# Patient Record
Sex: MALE | NOT HISPANIC OR LATINO | Employment: FULL TIME | URBAN - METROPOLITAN AREA
[De-identification: names, ages, dates, MRNs, and addresses within clinical notes are randomized per-mention and may not be internally consistent; named-entity substitution may affect disease eponyms.]

---

## 2017-07-25 ENCOUNTER — LAB REQUISITION (OUTPATIENT)
Dept: LAB | Facility: HOSPITAL | Age: 62
End: 2017-07-25

## 2017-07-25 ENCOUNTER — OUTSIDE FACILITY SERVICE (OUTPATIENT)
Dept: GASTROENTEROLOGY | Facility: CLINIC | Age: 62
End: 2017-07-25

## 2017-07-25 DIAGNOSIS — D12.2 BENIGN NEOPLASM OF ASCENDING COLON: ICD-10-CM

## 2017-07-25 PROCEDURE — 88305 TISSUE EXAM BY PATHOLOGIST: CPT | Performed by: INTERNAL MEDICINE

## 2017-07-25 PROCEDURE — 45385 COLONOSCOPY W/LESION REMOVAL: CPT | Performed by: INTERNAL MEDICINE

## 2017-07-26 LAB
CYTO UR: NORMAL
LAB AP CASE REPORT: NORMAL
LAB AP CLINICAL INFORMATION: NORMAL
Lab: NORMAL
PATH REPORT.FINAL DX SPEC: NORMAL
PATH REPORT.GROSS SPEC: NORMAL

## 2019-09-16 DIAGNOSIS — Z12.11 SCREENING FOR COLON CANCER: Primary | ICD-10-CM

## 2019-09-19 DIAGNOSIS — Z12.11 SCREENING FOR COLON CANCER: Primary | ICD-10-CM

## 2019-09-26 ENCOUNTER — OUTSIDE FACILITY SERVICE (OUTPATIENT)
Dept: GASTROENTEROLOGY | Facility: CLINIC | Age: 64
End: 2019-09-26

## 2019-09-26 ENCOUNTER — LAB REQUISITION (OUTPATIENT)
Dept: LAB | Facility: HOSPITAL | Age: 64
End: 2019-09-26

## 2019-09-26 DIAGNOSIS — Z86.010 HISTORY OF COLONIC POLYPS: ICD-10-CM

## 2019-09-26 DIAGNOSIS — Z12.11 ENCOUNTER FOR SCREENING FOR MALIGNANT NEOPLASM OF COLON: ICD-10-CM

## 2019-09-26 PROCEDURE — 45385 COLONOSCOPY W/LESION REMOVAL: CPT | Performed by: INTERNAL MEDICINE

## 2019-09-26 PROCEDURE — 88305 TISSUE EXAM BY PATHOLOGIST: CPT | Performed by: INTERNAL MEDICINE

## 2019-09-27 LAB
CYTO UR: NORMAL
LAB AP CASE REPORT: NORMAL
LAB AP CLINICAL INFORMATION: NORMAL
PATH REPORT.FINAL DX SPEC: NORMAL
PATH REPORT.GROSS SPEC: NORMAL

## 2024-05-01 ENCOUNTER — OFFICE VISIT (OUTPATIENT)
Dept: CARDIOLOGY | Facility: CLINIC | Age: 69
End: 2024-05-01
Payer: MEDICARE

## 2024-05-01 VITALS
OXYGEN SATURATION: 98 % | WEIGHT: 154 LBS | HEIGHT: 64 IN | DIASTOLIC BLOOD PRESSURE: 86 MMHG | BODY MASS INDEX: 26.29 KG/M2 | SYSTOLIC BLOOD PRESSURE: 180 MMHG | HEART RATE: 64 BPM

## 2024-05-01 DIAGNOSIS — R06.09 DOE (DYSPNEA ON EXERTION): Primary | ICD-10-CM

## 2024-05-01 DIAGNOSIS — E78.2 MIXED HYPERLIPIDEMIA: ICD-10-CM

## 2024-05-01 DIAGNOSIS — I10 PRIMARY HYPERTENSION: ICD-10-CM

## 2024-05-01 PROCEDURE — 99203 OFFICE O/P NEW LOW 30 MIN: CPT | Performed by: INTERNAL MEDICINE

## 2024-05-01 RX ORDER — TAMSULOSIN HYDROCHLORIDE 0.4 MG/1
1 CAPSULE ORAL
COMMUNITY
Start: 2024-02-14

## 2024-05-01 RX ORDER — FINASTERIDE 5 MG/1
5 TABLET, FILM COATED ORAL DAILY
COMMUNITY

## 2024-05-01 NOTE — PROGRESS NOTES
[facility]  Consultation H&P  Tiffany Pelayo  1955  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 year old male with the above mentioned PMH who presents for consult from Akin West MD for evaluation of hyperlipidemia and cardiovascular risk.    Pt denies any chest pain, dyspnea at rest, dyspnea on exertion, orthopnea, PND, palpitations, lower extremity edema, or claudication. Pt denies history of CHF, DVT, PE, MI, CVA, TIA, or rheumatic fever.   Pt with multiple family members with CAD events.   Patient states that he does yard work pushing and weed eating and does neighbors work as well.  He notes no overt symptoms.  He has not followed his blood pressure regularly at home  He notes he does have a high sodium diet     ROS  Review of Systems   Cardiovascular:  Positive for dyspnea on exertion.   All other systems reviewed and are negative.      SOCIAL HX  Social History     Socioeconomic History    Marital status:    Tobacco Use    Smoking status: Never    Smokeless tobacco: Never   Vaping Use    Vaping status: Never Used   Substance and Sexual Activity    Alcohol use: Never    Drug use: Never    Sexual activity: Yes     Partners: Female  "      FAMILY HX  Family History   Problem Relation Age of Onset    Cancer Mother     Heart attack Father     Cancer Brother        Vitals:    05/01/24 0900   BP: 180/86   BP Location: Right arm   Patient Position: Sitting   Cuff Size: Adult   Pulse: 64   SpO2: 98%   Weight: 69.9 kg (154 lb)   Height: 162.6 cm (64\")     Body mass index is 26.43 kg/m².     PHYSICAL EXAMINATION:  Constitutional:       Appearance: Healthy appearance. Not in distress.   Neck:      Vascular: No JVR. JVD normal.   Pulmonary:      Effort: Pulmonary effort is normal.      Breath sounds: Normal breath sounds. No wheezing. No rhonchi. No rales.   Chest:      Chest wall: Not tender to palpatation.   Cardiovascular:      PMI at left midclavicular line. Normal rate. Regular rhythm. Normal S1. Normal S2.       Murmurs: There is no murmur.      No gallop.  No click. No rub.   Pulses:     Intact distal pulses.   Edema:     Peripheral edema absent.   Abdominal:      General: Bowel sounds are normal.      Palpations: Abdomen is soft.      Tenderness: There is no abdominal tenderness.   Musculoskeletal: Normal range of motion.         General: No tenderness. Skin:     General: Skin is warm and dry.   Neurological:      General: No focal deficit present.      Mental Status: Alert and oriented to person, place and time.         Diagnostic Data:  Procedures    2/24/24  TSH 6.97  T4  1.0  Lipid 064-92-  CBC Normal  BMP , BUN 15, CR 1.15, , K 4.5, CA 10.9  LFTs Normal    Advance Care Planning   ACP discussion was held with the patient during this visit. Patient does not have an advance directive, information provided.         ASSESSMENT:     Diagnosis Plan   1. SAMPSON (dyspnea on exertion)        2. Mixed hyperlipidemia        3. Primary hypertension            PLAN:    Dyspnea on exertion multiplifactorial with document cardiac calcium score at this time    Mixed dyslipidemia document cardiac calcium score at this time.  Potential cardiac " CTA will be considered    One-time elevated blood pressure patient is to obtain home blood pressure monitor and will also review PCP notes with historical data      Akin West MD, thank you for referring Mr. Pelayo for evaluation.  I have forwarded my electronically generated recommendations to you for review.  Please do not hesitate to call with any questions.      Jose Calvin MD, FACC

## 2024-05-16 ENCOUNTER — TELEPHONE (OUTPATIENT)
Dept: CARDIOLOGY | Facility: CLINIC | Age: 69
End: 2024-05-16
Payer: MEDICARE

## 2024-05-16 NOTE — TELEPHONE ENCOUNTER
Jose Calvin MD Jackson, Kristina, RN  Cardiac calcium score 0    Notified pts spouse of the above results. Spouse verbalized understanding , no further questions or concerns .

## 2024-07-30 ENCOUNTER — OFFICE VISIT (OUTPATIENT)
Dept: UROLOGY | Facility: CLINIC | Age: 69
End: 2024-07-30
Payer: MEDICARE

## 2024-07-30 ENCOUNTER — LAB (OUTPATIENT)
Dept: LAB | Facility: HOSPITAL | Age: 69
End: 2024-07-30
Payer: MEDICARE

## 2024-07-30 VITALS
SYSTOLIC BLOOD PRESSURE: 148 MMHG | HEIGHT: 63 IN | TEMPERATURE: 97.9 F | HEART RATE: 71 BPM | BODY MASS INDEX: 28.07 KG/M2 | WEIGHT: 158.4 LBS | OXYGEN SATURATION: 97 % | RESPIRATION RATE: 12 BRPM | DIASTOLIC BLOOD PRESSURE: 90 MMHG

## 2024-07-30 DIAGNOSIS — N48.6 PEYRONIE'S DISEASE: ICD-10-CM

## 2024-07-30 DIAGNOSIS — N52.9 ERECTILE DYSFUNCTION, UNSPECIFIED ERECTILE DYSFUNCTION TYPE: ICD-10-CM

## 2024-07-30 DIAGNOSIS — N13.8 BENIGN PROSTATIC HYPERPLASIA WITH URINARY OBSTRUCTION: Primary | ICD-10-CM

## 2024-07-30 DIAGNOSIS — N13.8 BENIGN PROSTATIC HYPERPLASIA WITH URINARY OBSTRUCTION: ICD-10-CM

## 2024-07-30 DIAGNOSIS — N40.1 BENIGN PROSTATIC HYPERPLASIA WITH URINARY OBSTRUCTION: ICD-10-CM

## 2024-07-30 DIAGNOSIS — N40.1 BENIGN PROSTATIC HYPERPLASIA WITH URINARY OBSTRUCTION: Primary | ICD-10-CM

## 2024-07-30 LAB
BILIRUB BLD-MCNC: NEGATIVE MG/DL
CLARITY, POC: CLEAR
COLOR UR: NORMAL
EXPIRATION DATE: NORMAL
GLUCOSE UR STRIP-MCNC: NEGATIVE MG/DL
KETONES UR QL: NEGATIVE
LEUKOCYTE EST, POC: NEGATIVE
Lab: NORMAL
NITRITE UR-MCNC: NEGATIVE MG/ML
PH UR: 6 [PH] (ref 5–8)
PROT UR STRIP-MCNC: NEGATIVE MG/DL
PSA SERPL-MCNC: 4.47 NG/ML (ref 0–4)
RBC # UR STRIP: NEGATIVE /UL
SP GR UR: 1.01 (ref 1–1.03)
UROBILINOGEN UR QL: NORMAL

## 2024-07-30 PROCEDURE — 84153 ASSAY OF PSA TOTAL: CPT

## 2024-07-30 PROCEDURE — 36415 COLL VENOUS BLD VENIPUNCTURE: CPT

## 2024-07-30 RX ORDER — LORATADINE 10 MG/1
10 TABLET ORAL CONTINUOUS PRN
COMMUNITY

## 2024-07-30 NOTE — PROGRESS NOTES
Office Visit Males LUTS      Patient Name: Tiffany Pelayo  : 1955   MRN: [id number]     Chief Complaint:   Chief Complaint   Patient presents with    Benign Prostatic Hypertrophy    Establish Care       Referring Provider: Akin West MD    History of Present Illness: Tiffany Pelayo is a 69 y.o. male who presents today with history of BPH on Flomax who presents with complaints of urinary hesitency and incomplete emptying.  He was recently increased to Flomax twice daily by PCP which has not helped with symptoms.  Has had history of elevated PSA in the past.  No recent PSAs checked within the past 2 years.    Primary symptom includes: incomplete emptying, frequency, intermittency, urgency, weak stream, straining, and nocturia.    Patient denies hematuria.  Onset was in .  Previous treatments include: Finasteride and twice daily Flomax     IPSS Questionnaire (AUA-7):  Incomplete emptying  Over the past month, how often have you had a sensation of not emptying your bladder completely after you finished urinating?: Almost always (24)  Frequency  Over the past month, how often have you had to urinate again less than two hours after you finishied urinating ?: Almost always (24)  Intermittency  Over the past month, how often have you found you stopped and started again several time when you urinated ?: Almost always (24)  Urgency  Over the last month, how often have you found it difficult  have you found it difficult to postpone urination ?: Almost always (24)  Weak Stream  Over the past month, how often have you had a weak urinary stream ?: Almost always (24)  Straining  Over the past month, how often have you had to push or strain to begin urination ?: Almost always (24)  Nocturia  Over the past month, how many times did you most typically get up to urinate from the time you went to bed until the time you got up in the morning ?:  "3 times (07/30/24 0958)  Quality of life due to urinary symptoms  If you were to spend the rest of your life with your urinary condition the way it is now, how would feel about that?: Terrible (07/30/24 0958)    Scores  Total IPSS Score: (!) 33 (07/30/24 0958)  Total Score = Symptomatic Level: Severely symptomatic: 20-35 (07/30/24 0958)       Subjective      Review of System: ROS reviewed by me and is negative unless otherwise noted in HPI.      Past Medical History:   Past Medical History:   Diagnosis Date    Bell palsy 2015    Colon polyp     Kidney stone 2014     Past Surgical History: History reviewed. No pertinent surgical history.    Family History:   Family History   Problem Relation Age of Onset    Heart attack Father     Benign prostatic hyperplasia Father     Uterine cancer Mother 68    Lung cancer Brother     Prostate cancer Neg Hx     Kidney cancer Neg Hx      Social History:   Social History     Socioeconomic History    Marital status:    Tobacco Use    Smoking status: Never     Passive exposure: Past    Smokeless tobacco: Never   Vaping Use    Vaping status: Never Used   Substance and Sexual Activity    Alcohol use: Never    Drug use: Never    Sexual activity: Yes     Partners: Female       Medications:     Current Outpatient Medications:     finasteride (PROSCAR) 5 MG tablet, Take 1 tablet by mouth Daily., Disp: , Rfl:     loratadine (CLARITIN) 10 MG tablet, Take 1 tablet by mouth Continuous As Needed for Allergies., Disp: , Rfl:     tamsulosin (FLOMAX) 0.4 MG capsule 24 hr capsule, Take 1 capsule by mouth every night at bedtime., Disp: , Rfl:     Allergies:   No Known Allergies    Objective     Physical Exam:   Vital Signs:   Vitals:    07/30/24 0957   BP: 148/90   BP Location: Left arm   Patient Position: Sitting   Cuff Size: Adult   Pulse: 71   Resp: 12   Temp: 97.9 °F (36.6 °C)   TempSrc: Temporal   SpO2: 97%   Weight: 71.8 kg (158 lb 6.4 oz)   Height: 161.1 cm (63.43\")     Body mass index " "is 27.68 kg/m².   Physical Exam  Vitals and nursing note reviewed.   Constitutional:       General: He is not in acute distress.     Appearance: Normal appearance. He is not ill-appearing.   HENT:      Head: Normocephalic and atraumatic.   Pulmonary:      Effort: Pulmonary effort is normal.   Genitourinary:     Comments: Deferred   Skin:     General: Skin is warm and dry.   Neurological:      General: No focal deficit present.      Mental Status: He is alert and oriented to person, place, and time.   Psychiatric:         Mood and Affect: Mood normal.         Behavior: Behavior normal.     Labs  No results found for: \"PSA\"    Brief Urine Lab Results  (Last result in the past 365 days)        Color   Clarity   Blood   Leuk Est   Nitrite   Protein   CREAT   Urine HCG        07/30/24 1008 Straw   Clear   Negative   Negative   Negative   Negative                 PVR  Post-void residual performed by staff - 25 ml.     Assessment / Plan      Assessment  Mr. Pelayo is a 69 y.o. male who presents with LUTS, primarily urinary hesitancy and feelings of incomplete emptying.  His IPSS is 33 and he rates his symptoms as terrible.  He is interested in proceeding with surgery if that is an option.  We discussed BPH work up which includes cystoscopy, TRUS, uroflow, general exam and LIZET with Dr. Shelton.  He would like to proceed.      PVR 25 ml.  UA is benign.      We discussed that due to his history of elevated PSAs in the past I would like to  have him check one today.  If elevated I will call him to discuss further at that time.      He reports that he is a history of ED and when he took Viagra in the past he had penile curvature and has not attempted since.  Would like to discuss at a future visit after BPH symptoms are managed.      Will have him follow up with Dr. Shelton for BPH workup.  He was agreeable to this plan.      Diagnoses and all orders for this visit:    1. Benign prostatic hyperplasia with urinary obstruction " (Primary)  -     POC Urinalysis Dipstick, Automated  -     PSA Diagnostic; Future    2. Peyronie's disease    3. Erectile dysfunction, unspecified erectile dysfunction type    Follow Up:   Return for with Dr. Shelton for cystoscopy, TRUS, and uroflow .    Nalini Tamayo, APRN, MSN, FNP-C  [facility]

## 2024-08-07 ENCOUNTER — OFFICE VISIT (OUTPATIENT)
Dept: UROLOGY | Facility: CLINIC | Age: 69
End: 2024-08-07
Payer: MEDICARE

## 2024-08-07 VITALS
DIASTOLIC BLOOD PRESSURE: 82 MMHG | HEIGHT: 63 IN | BODY MASS INDEX: 28 KG/M2 | SYSTOLIC BLOOD PRESSURE: 132 MMHG | WEIGHT: 158 LBS

## 2024-08-07 DIAGNOSIS — R97.20 BPH WITH ELEVATED PSA: Primary | ICD-10-CM

## 2024-08-07 DIAGNOSIS — N40.0 BPH WITH ELEVATED PSA: Primary | ICD-10-CM

## 2024-08-07 NOTE — PROGRESS NOTES
Office Visit Elevated PSA     Patient Name: Tiffany Pelayo  : 1955   MRN: [id number]     Chief Complaint: Elevated PSA.    Chief Complaint   Patient presents with    Follow-up     Psa results        Referring Provider: No ref. provider found    History of Present Illness: Mr. Tiffany Pelayo is a 69 y.o.  male who presents with history of BPH and elevated PSA to   PSA          2024    11:18   PSA   PSA 4.470    .      History of elevated PSA in the past but was told not to worry about it.  Patient complains of daytime frequency, intermittency, sensation of incomplete bladder emptying, urgency, and weak urinary stream.  His IPSS score is 33.     Patient denies fevers, chills, nausea, vomiting, constipation, flank pain, shortness of breath, leg swelling, calf pain or bone pain.    Past  history is frequent UTIs, gross hematuria, stones or other renal diseases.     No family history of prostate cancer.     Subjective      Review of System: ROS reviewed by me and is negative unless otherwise noted in HPI.      Past Medical History:   Past Medical History:   Diagnosis Date    Bell palsy     Colon polyp     Kidney stone        Past Surgical History: History reviewed. No pertinent surgical history.    Family History:   Family History   Problem Relation Age of Onset    Heart attack Father     Benign prostatic hyperplasia Father     Uterine cancer Mother 68    Lung cancer Brother     Prostate cancer Neg Hx     Kidney cancer Neg Hx      Social History:   Social History     Socioeconomic History    Marital status:    Tobacco Use    Smoking status: Never     Passive exposure: Past    Smokeless tobacco: Never   Vaping Use    Vaping status: Never Used   Substance and Sexual Activity    Alcohol use: Never    Drug use: Never    Sexual activity: Yes     Partners: Female       Medications:     Current Outpatient Medications:     finasteride (PROSCAR) 5 MG tablet, Take 1 tablet by mouth  "Daily., Disp: , Rfl:     loratadine (CLARITIN) 10 MG tablet, Take 1 tablet by mouth Continuous As Needed for Allergies., Disp: , Rfl:     tamsulosin (FLOMAX) 0.4 MG capsule 24 hr capsule, Take 1 capsule by mouth every night at bedtime., Disp: , Rfl:     Allergies:   No Known Allergies    Objective     Physical Exam:   Vital Signs:   Vitals:    08/07/24 0817   BP: 132/82   BP Location: Left arm   Patient Position: Sitting   Cuff Size: Adult   Weight: 71.7 kg (158 lb)   Height: 161.1 cm (63.43\")     Body mass index is 27.61 kg/m².   Physical Exam  Vitals and nursing note reviewed.   Constitutional:       General: He is not in acute distress.     Appearance: Normal appearance. He is not ill-appearing.   HENT:      Head: Normocephalic and atraumatic.   Pulmonary:      Effort: Pulmonary effort is normal.   Skin:     General: Skin is warm and dry.   Neurological:      General: No focal deficit present.      Mental Status: He is alert and oriented to person, place, and time.   Psychiatric:         Mood and Affect: Mood normal.         Behavior: Behavior normal.        Labs  Brief Urine Lab Results  (Last result in the past 365 days)        Color   Clarity   Blood   Leuk Est   Nitrite   Protein   CREAT   Urine HCG        07/30/24 1008 Straw   Clear   Negative   Negative   Negative   Negative                   Lab Results   Component Value Date    PSA 4.470 (H) 07/30/2024     Images:   CT HEART WITH 3D IMAGE  Result Date: 5/15/2024  Coronary artery calcium score:0 Percentile rank for age:0 No identification of atherosclerotic plaque. 5 mm left lower lobe pulmonary nodule. Six month follow up chest CT recommended per Fleischner     Assessment / Plan      Assessment  Mr. Pelayo is a 69 y.o.  male who presents with elevated PSA.  This is a new diagnosis of uncertain clinical prognosis.    I explained to the patient that an elevated PSA is a marker of risk of prostate cancer and a ExoDX would be the next step in " evaluation.  The ExoDX test will help determine the probability of finding an aggressive prostate cancer if we were to biopsy.      He is scheduled to follow up with Dr. Shelton for BPH work up in September.  Will keep this appointment as scheduled and have him return to see me and review ExoDx before this appointment.  He was agreeable to this plan.  Questions and concerns addressed to the patient's and wife's satisfaction.      Diagnoses and all orders for this visit:    1. BPH with elevated PSA (Primary)    Follow Up:   Return in about 2 weeks (around 8/21/2024) for Next scheduled follow up, with Nalini to review ExoDX.    JUANITA Hale, MSN, FNP-C  [facility]

## 2024-08-23 ENCOUNTER — OFFICE VISIT (OUTPATIENT)
Dept: UROLOGY | Facility: CLINIC | Age: 69
End: 2024-08-23
Payer: MEDICARE

## 2024-08-23 VITALS
OXYGEN SATURATION: 99 % | HEART RATE: 61 BPM | RESPIRATION RATE: 12 BRPM | HEIGHT: 63 IN | TEMPERATURE: 97.1 F | BODY MASS INDEX: 28 KG/M2 | WEIGHT: 158 LBS

## 2024-08-23 DIAGNOSIS — N40.1 BENIGN PROSTATIC HYPERPLASIA WITH URINARY OBSTRUCTION: Primary | ICD-10-CM

## 2024-08-23 DIAGNOSIS — N13.8 BENIGN PROSTATIC HYPERPLASIA WITH URINARY OBSTRUCTION: Primary | ICD-10-CM

## 2024-08-23 DIAGNOSIS — R97.20 ELEVATED PROSTATE SPECIFIC ANTIGEN (PSA): ICD-10-CM

## 2024-08-23 NOTE — PROGRESS NOTES
Office Visit Established Male Patient     Patient Name: Tiffany Pelayo  : 1955   MRN: [id number]     Chief Complaint:   Chief Complaint   Patient presents with    Benign Prostatic Hypertrophy    Follow-up       History of Present Illness: Mr. Tiffany Pelayo is a 69 y.o. male who presents today for follow up with history of BPH and elevated PSA. He is scheduled to undergo BPH work up with Dr. Shelton in September but he presents today to review his ExoDX test results.  He is feeling well today and has no new complaints.      Subjective      Review of System: ROS reviewed by me and is negative unless otherwise noted in HPI.      Past Medical History:   Past Medical History:   Diagnosis Date    Bell palsy     Benign prostatic hyperplasia     Colon polyp     Elevated PSA     Kidney stone 2014    Urinary incontinence      Past Surgical History: History reviewed. No pertinent surgical history.    Family History:   Family History   Problem Relation Age of Onset    Heart attack Father     Benign prostatic hyperplasia Father     Uterine cancer Mother 68    Lung cancer Brother     Prostate cancer Neg Hx     Kidney cancer Neg Hx      Social History:   Social History     Socioeconomic History    Marital status:    Tobacco Use    Smoking status: Never     Passive exposure: Past    Smokeless tobacco: Never   Vaping Use    Vaping status: Never Used   Substance and Sexual Activity    Alcohol use: Never    Drug use: Never    Sexual activity: Not Currently     Partners: Female     Medications:     Current Outpatient Medications:     finasteride (PROSCAR) 5 MG tablet, Take 1 tablet by mouth Daily., Disp: , Rfl:     loratadine (CLARITIN) 10 MG tablet, Take 1 tablet by mouth Continuous As Needed for Allergies., Disp: , Rfl:     tamsulosin (FLOMAX) 0.4 MG capsule 24 hr capsule, Take 1 capsule by mouth every night at bedtime., Disp: , Rfl:     Allergies:   No Known Allergies    Objective     Physical Exam:   Vital  "Signs:   Vitals:    08/23/24 1008   Pulse: 61   Resp: 12   Temp: 97.1 °F (36.2 °C)   TempSrc: Temporal   SpO2: 99%   Weight: 71.7 kg (158 lb)   Height: 161.1 cm (63.43\")     Body mass index is 27.61 kg/m².   Physical Exam  Vitals and nursing note reviewed.   Constitutional:       General: He is not in acute distress.     Appearance: Normal appearance. He is not ill-appearing.   HENT:      Head: Normocephalic and atraumatic.   Pulmonary:      Effort: Pulmonary effort is normal.   Skin:     General: Skin is warm and dry.   Neurological:      General: No focal deficit present.      Mental Status: He is alert and oriented to person, place, and time.   Psychiatric:         Mood and Affect: Mood normal.         Behavior: Behavior normal.      Labs  Brief Urine Lab Results  (Last result in the past 365 days)        Color   Clarity   Blood   Leuk Est   Nitrite   Protein   CREAT   Urine HCG        07/30/24 1008 Straw   Clear   Negative   Negative   Negative   Negative                 Radiographic Studies  CT HEART WITH 3D IMAGE  Result Date: 5/15/2024  Coronary artery calcium score:0 Percentile rank for age:0 No identification of atherosclerotic plaque. 5 mm left lower lobe pulmonary nodule. Six month follow up chest CT recommended per Fleischner criteria.    I have reviewed the above labs and imaging.     Assessment / Plan      Assessment/Plan: Mr. Tiffany Pelayo is a 69 y.o. male who presents today for follow up with history of BPH and elevated PSA. He is scheduled to undergo BPH work up with Dr. Shelton in September but he presents today to review his ExoDX test results.  His ExoDx prostate test score is 20.64 which indicates that he has a higher risk of finding a high grade prostate cancer if biopsy were to be performed.      Shared decision making discussion was had today.  He would like to proceed with a prostate MRI for further evaluation and keep scheduled follow up with Dr. Shelton for the BPH work up.  Will have Mr. Pelayo " keep next appointment as scheduled and follow up with me to review prostate MRI results.  He was agreeable to this plan.  Questions/concerns addressed.     Diagnoses and all orders for this visit:    1. Benign prostatic hyperplasia with urinary obstruction (Primary)    2. Elevated prostate specific antigen (PSA)  -     MRI prostate w wo contrast; Future    Follow Up:   Return for Keep Follow up with Dr. Shelton for BPH work up.  Then with Nalini to review MRI.  .    JUANITA Hale, MSN, FNP-C  [facility]

## 2024-09-03 ENCOUNTER — HOSPITAL ENCOUNTER (OUTPATIENT)
Dept: MRI IMAGING | Facility: HOSPITAL | Age: 69
Discharge: HOME | End: 2024-09-03
Admitting: NURSE PRACTITIONER
Payer: MEDICARE

## 2024-09-03 DIAGNOSIS — R97.20 ELEVATED PROSTATE SPECIFIC ANTIGEN (PSA): ICD-10-CM

## 2024-09-03 PROCEDURE — 72197 MRI PELVIS W/O & W/DYE: CPT

## 2024-09-03 PROCEDURE — 0 GADOBENATE DIMEGLUMINE 529 MG/ML SOLUTION: Performed by: NURSE PRACTITIONER

## 2024-09-03 PROCEDURE — A9577 INJ MULTIHANCE: HCPCS | Performed by: NURSE PRACTITIONER

## 2024-09-03 RX ADMIN — GADOBENATE DIMEGLUMINE 14 ML: 529 INJECTION, SOLUTION INTRAVENOUS at 08:01

## 2024-09-12 ENCOUNTER — PROCEDURE VISIT (OUTPATIENT)
Dept: UROLOGY | Facility: CLINIC | Age: 69
End: 2024-09-12
Payer: MEDICARE

## 2024-09-12 ENCOUNTER — PRE-ADMISSION TESTING (OUTPATIENT)
Dept: PREADMISSION TESTING | Facility: HOSPITAL | Age: 69
End: 2024-09-12
Payer: MEDICARE

## 2024-09-12 VITALS — WEIGHT: 156.6 LBS | BODY MASS INDEX: 26.88 KG/M2

## 2024-09-12 DIAGNOSIS — N40.0 BPH WITH ELEVATED PSA: ICD-10-CM

## 2024-09-12 DIAGNOSIS — N13.8 BPH WITH OBSTRUCTION/LOWER URINARY TRACT SYMPTOMS: ICD-10-CM

## 2024-09-12 DIAGNOSIS — N40.0 BPH WITH ELEVATED PSA: Primary | ICD-10-CM

## 2024-09-12 DIAGNOSIS — N21.0 BLADDER STONE: ICD-10-CM

## 2024-09-12 DIAGNOSIS — R97.20 BPH WITH ELEVATED PSA: ICD-10-CM

## 2024-09-12 DIAGNOSIS — N40.1 BPH WITH OBSTRUCTION/LOWER URINARY TRACT SYMPTOMS: ICD-10-CM

## 2024-09-12 DIAGNOSIS — N39.41 URGE INCONTINENCE OF URINE: ICD-10-CM

## 2024-09-12 DIAGNOSIS — R97.20 BPH WITH ELEVATED PSA: Primary | ICD-10-CM

## 2024-09-12 LAB
ANION GAP SERPL CALCULATED.3IONS-SCNC: 7.5 MMOL/L (ref 5–15)
BACTERIA UR QL AUTO: ABNORMAL /HPF
BILIRUB UR QL STRIP: NEGATIVE
BUN SERPL-MCNC: 13 MG/DL (ref 8–23)
BUN/CREAT SERPL: 10.7 (ref 7–25)
CALCIUM SPEC-SCNC: 10.6 MG/DL (ref 8.6–10.5)
CHLORIDE SERPL-SCNC: 104 MMOL/L (ref 98–107)
CLARITY UR: CLEAR
CO2 SERPL-SCNC: 24.5 MMOL/L (ref 22–29)
COLOR UR: YELLOW
CREAT SERPL-MCNC: 1.22 MG/DL (ref 0.76–1.27)
DEPRECATED RDW RBC AUTO: 40.7 FL (ref 37–54)
EGFRCR SERPLBLD CKD-EPI 2021: 64.2 ML/MIN/1.73
ERYTHROCYTE [DISTWIDTH] IN BLOOD BY AUTOMATED COUNT: 13.1 % (ref 12.3–15.4)
GLUCOSE SERPL-MCNC: 119 MG/DL (ref 65–99)
GLUCOSE UR STRIP-MCNC: NEGATIVE MG/DL
HCT VFR BLD AUTO: 47.1 % (ref 37.5–51)
HGB BLD-MCNC: 16.1 G/DL (ref 13–17.7)
HGB UR QL STRIP.AUTO: ABNORMAL
HYALINE CASTS UR QL AUTO: ABNORMAL /LPF
KETONES UR QL STRIP: NEGATIVE
LEUKOCYTE ESTERASE UR QL STRIP.AUTO: ABNORMAL
MCH RBC QN AUTO: 29.1 PG (ref 26.6–33)
MCHC RBC AUTO-ENTMCNC: 34.2 G/DL (ref 31.5–35.7)
MCV RBC AUTO: 85 FL (ref 79–97)
NITRITE UR QL STRIP: NEGATIVE
PH UR STRIP.AUTO: 5.5 [PH] (ref 5–8)
PLATELET # BLD AUTO: 280 10*3/MM3 (ref 140–450)
PMV BLD AUTO: 10.6 FL (ref 6–12)
POTASSIUM SERPL-SCNC: 4.7 MMOL/L (ref 3.5–5.2)
PROT UR QL STRIP: ABNORMAL
RBC # BLD AUTO: 5.54 10*6/MM3 (ref 4.14–5.8)
RBC # UR STRIP: ABNORMAL /HPF
REF LAB TEST METHOD: ABNORMAL
SODIUM SERPL-SCNC: 136 MMOL/L (ref 136–145)
SP GR UR STRIP: 1.01 (ref 1–1.03)
SQUAMOUS #/AREA URNS HPF: ABNORMAL /HPF
UROBILINOGEN UR QL STRIP: ABNORMAL
WBC # UR STRIP: ABNORMAL /HPF
WBC NRBC COR # BLD AUTO: 7.14 10*3/MM3 (ref 3.4–10.8)

## 2024-09-12 PROCEDURE — 81001 URINALYSIS AUTO W/SCOPE: CPT

## 2024-09-12 PROCEDURE — 36415 COLL VENOUS BLD VENIPUNCTURE: CPT

## 2024-09-12 PROCEDURE — 85027 COMPLETE CBC AUTOMATED: CPT

## 2024-09-12 PROCEDURE — 87086 URINE CULTURE/COLONY COUNT: CPT

## 2024-09-12 PROCEDURE — 80048 BASIC METABOLIC PNL TOTAL CA: CPT

## 2024-09-12 RX ORDER — SODIUM CHLORIDE 0.9 % (FLUSH) 0.9 %
3-10 SYRINGE (ML) INJECTION AS NEEDED
OUTPATIENT
Start: 2024-09-12

## 2024-09-12 RX ORDER — SODIUM CHLORIDE 9 MG/ML
100 INJECTION, SOLUTION INTRAVENOUS CONTINUOUS
OUTPATIENT
Start: 2024-09-12

## 2024-09-12 RX ORDER — SODIUM CHLORIDE 9 MG/ML
40 INJECTION, SOLUTION INTRAVENOUS AS NEEDED
OUTPATIENT
Start: 2024-09-12

## 2024-09-12 RX ORDER — SODIUM CHLORIDE 0.9 % (FLUSH) 0.9 %
3 SYRINGE (ML) INJECTION EVERY 12 HOURS SCHEDULED
OUTPATIENT
Start: 2024-09-12

## 2024-09-12 NOTE — PROGRESS NOTES
"CC  LUTS / BPH Workup    HPI  Ms. Pelayo is a 69 y.o. male with history below in assessment, who presents for follow up.     At this visit patient is here for BPH workup and discussion.     Past Medical History:   Diagnosis Date    Bell palsy 2015    Benign prostatic hyperplasia     Colon polyp     Elevated PSA     Kidney stone 2014    Urinary incontinence        No past surgical history on file.      Current Outpatient Medications:     finasteride (PROSCAR) 5 MG tablet, Take 1 tablet by mouth Daily., Disp: , Rfl:     loratadine (CLARITIN) 10 MG tablet, Take 1 tablet by mouth Continuous As Needed for Allergies., Disp: , Rfl:     tamsulosin (FLOMAX) 0.4 MG capsule 24 hr capsule, Take 1 capsule by mouth every night at bedtime., Disp: , Rfl:      Physical Exam  There were no vitals taken for this visit.    Labs  Brief Urine Lab Results  (Last result in the past 365 days)        Color   Clarity   Blood   Leuk Est   Nitrite   Protein   CREAT   Urine HCG        07/30/24 1008 Straw   Clear   Negative   Negative   Negative   Negative                   No results found for: \"GLUCOSE\", \"CALCIUM\", \"NA\", \"K\", \"CO2\", \"CL\", \"BUN\", \"CREATININE\", \"EGFRIFAFRI\", \"EGFRIFNONA\", \"BCR\", \"ANIONGAP\"    No results found for: \"WBC\", \"HGB\", \"HCT\", \"MCV\", \"PLT\"         Lab Results   Component Value Date    PSA 4.470 (H) 07/30/2024       Radiographic Studies  MRI Prostate With & Without Contrast  Result Date: 9/5/2024  Impression: No MR findings of clinically significant prostate cancer (PI-RADS 2 exam). BPH with gland volume of 145 cc. Urinary bladder stone. Electronically Signed: Stephen Sunshine MD  9/5/2024 1:37 PM EDT  Workstation ID: [id number]    CT HEART WITH 3D IMAGE  Result Date: 5/15/2024  Coronary artery calcium score:0 Percentile rank for age:0 No identification of atherosclerotic plaque. 5 mm left lower lobe pulmonary nodule. Six month follow up chest CT recommended per Fleischner criteria. Images reviewed, interpreted, and dictated by " Dr. GWENDOLYN Bustillo. Transcribed by Karen Miner PA-C.    I have reviewed above labs and imaging.     CYSTOSCOPY  Preprocedure diagnosis  LUTS  Postprocedure diagnosis  Same  Procedure  Flexible Cystourethroscopy  Attending surgeon  Johnie Shelton MD  Anesthesia  2% lidocaine jelly intraurethrally  Complications  None  Indications  69 y.o. male undergoing a flexible cystoscopy for the above mentioned indications.  Informed consent was obtained.    Findings  Cystoscopic findings included one right and left ureteral orifice in the normal anatomic position with chronically damaged bladder mucosa and no tumors. The urethral urothelium was within normal limits with no strictures.  There was a prominent median lobe.  The lateral lobes were very large and obstructive in appearance.  Large bladder stone  Procedure  The patient was placed in supine position and prepped and draped in sterile fashion with lidocaine jelly per urethra for anesthesia.  A timeout was performed.  The 14F flexible cystoscope was lubricated and gently placed through the penile urethra and into the bladder.  The bladder was completely visualized.  The cystoscope was retroflexed and the bladder neck and prostate visualized.  The cystoscope was slowly withdrawn while visualizing the urethra and the procedure terminated.  The patient tolerated the procedure well.        UROFLOW  His uroflow would not for now but his stream was very weak.    I personally reviewed  and interpreted this study.       Assessment  69 y.o. male with worsening of chronic lower urinary tract symptoms.  145 cc prostate with large median lobe, difficulty emptying, urge incontinence weak stream, large 1.5 cm bladder stone    In a separate room and encounter after his workup, we discussed that he is maxed out on medical therapy.  He is otherwise quite healthy.    Plan  1. Schedule for HoLEP and cystolitholapaxy.  Risks are very large prostate and bladder  stone.

## 2024-09-12 NOTE — DISCHARGE INSTRUCTIONS
Pre-Admission testing appointment completed today for patient's upcoming procedure on 9/20/24 at [facility].     PAT PASS reviewed with patient and they verbalize understanding of the following:     Do not eat or drink anything after midnight the night before procedure unless otherwise instructed by physician/surgeon's office, this includes no gum, candy, mints, tobacco products or e-cigarettes.  Do not shave the area to be operated on at least 48 hours prior to procedure.  Do not wear makeup, lotion, hair products, or nail polish.  Do not wear any jewelry and remove all piercings.  Do not wear any adhesive if you wear dentures.  Do not wear contacts; bring in glasses if needed.  Only take medications on the morning of procedure as instructed by PAT nurse per anesthesia guidelines or as instructed by physician's office.  If you are on any blood thinners reach out to the physician/surgeon's office for instructions on when/if they will need to be stopped prior to procedure.  Bring in picture ID and insurance card, advanced directive copies if applicable, CPAP/BIPAP/Inhalers if indicated morning of procedure, leave any other valuables at home.  Ensure you have arranged for someone to drive you home the day of your procedure and someone to care for you at home afterwards. It is recommended that you do not drive, drink alcohol, or make any major legal decisions for at least 24 hours after your procedure is complete.   Chlorhexidine sponge along with instruction/information sheet given to patient. Readybath wipes given in lieu of CHG if patient has CHG allergy. Instructed patient to date, time, and initial the verification sheet once skin prep has been completed, and to return to [facility] the day of the procedure.       Introduction to anesthesia video watched by patient during appointment and anesthesia FAQ tip sheet given to patient.  Instructions and information given to patient about parking, hospital  entrance, and registration location.

## 2024-09-13 ENCOUNTER — TELEPHONE (OUTPATIENT)
Dept: UROLOGY | Facility: CLINIC | Age: 69
End: 2024-09-13

## 2024-09-13 DIAGNOSIS — N21.0 BLADDER STONE: Primary | ICD-10-CM

## 2024-09-13 LAB — BACTERIA SPEC AEROBE CULT: NO GROWTH

## 2024-09-13 RX ORDER — OXYCODONE HYDROCHLORIDE 5 MG/1
5 TABLET ORAL EVERY 6 HOURS PRN
Qty: 5 TABLET | Refills: 0 | Status: SHIPPED | OUTPATIENT
Start: 2024-09-13

## 2024-09-13 RX ORDER — OXYBUTYNIN CHLORIDE 10 MG/1
10 TABLET, EXTENDED RELEASE ORAL DAILY PRN
Qty: 10 TABLET | Refills: 0 | Status: SHIPPED | OUTPATIENT
Start: 2024-09-13

## 2024-09-13 RX ORDER — DOCUSATE SODIUM 100 MG/1
100 CAPSULE, LIQUID FILLED ORAL 2 TIMES DAILY
Qty: 15 CAPSULE | Refills: 1 | Status: SHIPPED | OUTPATIENT
Start: 2024-09-13

## 2024-09-13 RX ORDER — ACETAMINOPHEN 500 MG
1000 TABLET ORAL EVERY 6 HOURS
Qty: 30 TABLET | Refills: 0 | Status: SHIPPED | OUTPATIENT
Start: 2024-09-13 | End: 2024-09-16

## 2024-09-13 NOTE — TELEPHONE ENCOUNTER
Caller: DANIEL UMANA    Relationship: Emergency Contact    Best call back number: [phone]    Requested Prescriptions:   PAIN MEDICATION       Pharmacy where request should be sent:  [facility] [place], [place]    Last office visit with prescribing clinician: Visit date not found   Last telemedicine visit with prescribing clinician: Visit date not found   Next office visit with prescribing clinician: Visit date not found     Additional details provided by patient: PT LAST SEEN 9.12.24 AND IS EXPERIENCING PAIN SINCE DISCOVERY OF BLADDER STONE NEED PAIN RX THAT WILL LAST UNITL SURGERY 9.20.24    Does the patient have less than a 3 day supply:  [x] Yes  [] No    Would you like a call back once the refill request has been completed: [x] Yes [] No    If the office needs to give you a call back, can they leave a voicemail: [x] Yes [] No    Fior Turner Rep   09/13/24 11:13 EDT

## 2024-10-14 ENCOUNTER — OFFICE VISIT (OUTPATIENT)
Dept: UROLOGY | Facility: CLINIC | Age: 69
End: 2024-10-14
Payer: MEDICARE

## 2024-10-14 VITALS
HEIGHT: 64 IN | OXYGEN SATURATION: 98 % | SYSTOLIC BLOOD PRESSURE: 160 MMHG | BODY MASS INDEX: 26.63 KG/M2 | DIASTOLIC BLOOD PRESSURE: 88 MMHG | HEART RATE: 73 BPM | WEIGHT: 156 LBS

## 2024-10-14 DIAGNOSIS — N21.0 BLADDER STONE: Primary | ICD-10-CM

## 2024-10-14 DIAGNOSIS — R30.0 DYSURIA: ICD-10-CM

## 2024-10-14 DIAGNOSIS — N39.41 URGE INCONTINENCE OF URINE: ICD-10-CM

## 2024-10-14 DIAGNOSIS — N41.9 PROSTATITIS, UNSPECIFIED PROSTATITIS TYPE: ICD-10-CM

## 2024-10-14 DIAGNOSIS — N40.1 BPH WITH OBSTRUCTION/LOWER URINARY TRACT SYMPTOMS: ICD-10-CM

## 2024-10-14 DIAGNOSIS — N13.8 BPH WITH OBSTRUCTION/LOWER URINARY TRACT SYMPTOMS: ICD-10-CM

## 2024-10-14 PROCEDURE — 1160F RVW MEDS BY RX/DR IN RCRD: CPT | Performed by: UROLOGY

## 2024-10-14 PROCEDURE — 1159F MED LIST DOCD IN RCRD: CPT | Performed by: UROLOGY

## 2024-10-14 PROCEDURE — 99214 OFFICE O/P EST MOD 30 MIN: CPT | Performed by: UROLOGY

## 2024-10-14 PROCEDURE — 96372 THER/PROPH/DIAG INJ SC/IM: CPT | Performed by: UROLOGY

## 2024-10-14 PROCEDURE — 51798 US URINE CAPACITY MEASURE: CPT | Performed by: UROLOGY

## 2024-10-14 RX ORDER — TAMSULOSIN HYDROCHLORIDE 0.4 MG/1
1 CAPSULE ORAL
Qty: 90 CAPSULE | Refills: 4 | Status: SHIPPED | OUTPATIENT
Start: 2024-10-14

## 2024-10-14 RX ORDER — CEFTRIAXONE 1 G/1
1 INJECTION, POWDER, FOR SOLUTION INTRAMUSCULAR; INTRAVENOUS ONCE
Status: COMPLETED | OUTPATIENT
Start: 2024-10-14 | End: 2024-10-14

## 2024-10-14 RX ORDER — LEVOFLOXACIN 500 MG/1
500 TABLET, FILM COATED ORAL DAILY
Qty: 14 TABLET | Refills: 0 | Status: SHIPPED | OUTPATIENT
Start: 2024-10-14 | End: 2024-10-28

## 2024-10-14 RX ADMIN — CEFTRIAXONE 1 G: 1 INJECTION, POWDER, FOR SOLUTION INTRAMUSCULAR; INTRAVENOUS at 15:48

## 2024-10-18 DIAGNOSIS — N13.8 BPH WITH OBSTRUCTION/LOWER URINARY TRACT SYMPTOMS: Primary | ICD-10-CM

## 2024-10-18 DIAGNOSIS — N40.1 BPH WITH OBSTRUCTION/LOWER URINARY TRACT SYMPTOMS: Primary | ICD-10-CM

## 2024-10-18 LAB
BACTERIA UR CULT: ABNORMAL
BACTERIA UR CULT: ABNORMAL
OTHER ANTIBIOTIC SUSC ISLT: ABNORMAL

## 2024-10-18 RX ORDER — SULFAMETHOXAZOLE/TRIMETHOPRIM 800-160 MG
1 TABLET ORAL 2 TIMES DAILY
Qty: 14 TABLET | Refills: 0 | Status: SHIPPED | OUTPATIENT
Start: 2024-10-18 | End: 2024-10-25

## 2024-10-18 NOTE — PROGRESS NOTES
Please tell patient that his culture from the prostatitis did grow E. coli.  Finish out the Levaquin, and I sent a different antibiotic to start taking 1 week prior to surgery.

## 2024-10-22 ENCOUNTER — TELEPHONE (OUTPATIENT)
Dept: UROLOGY | Facility: CLINIC | Age: 69
End: 2024-10-22
Payer: MEDICARE

## 2024-12-20 ENCOUNTER — TELEPHONE (OUTPATIENT)
Dept: UROLOGY | Facility: CLINIC | Age: 69
End: 2024-12-20

## 2024-12-20 NOTE — TELEPHONE ENCOUNTER
Hub staff attempted to follow warm transfer process and was unsuccessful     Caller: DANIEL UMANA    Relationship to patient: Emergency Contact    Best call back number: [phone]    Patient is needing: RECEIVED  A CALL FROM PT'S SPOUSE CALLING IN REGARDS FOR PT TO BE RESCHEDULED FOR THE PROCEDURE. OFFICE HAS NOT RESCHEDULED. PROCEDURE WAS SCHEDULED ON 11/22/24. OFFICE PLEASE CALL BACK FOR PT TO BE RESCHEDULED FOR THE PROCEDURE.

## 2024-12-23 DIAGNOSIS — N13.8 BPH WITH OBSTRUCTION/LOWER URINARY TRACT SYMPTOMS: Primary | ICD-10-CM

## 2024-12-23 DIAGNOSIS — N21.0 BLADDER STONE: ICD-10-CM

## 2024-12-23 DIAGNOSIS — N40.1 BPH WITH OBSTRUCTION/LOWER URINARY TRACT SYMPTOMS: Primary | ICD-10-CM

## 2024-12-23 RX ORDER — NITROFURANTOIN 25; 75 MG/1; MG/1
100 CAPSULE ORAL 2 TIMES DAILY
Qty: 14 CAPSULE | Refills: 0 | Status: SHIPPED | OUTPATIENT
Start: 2024-12-23

## 2025-01-07 ENCOUNTER — LAB REQUISITION (OUTPATIENT)
Dept: LAB | Facility: HOSPITAL | Age: 70
End: 2025-01-07
Payer: MEDICARE

## 2025-01-07 DIAGNOSIS — N21.0 CALCULUS IN BLADDER: ICD-10-CM

## 2025-01-07 PROCEDURE — 87205 SMEAR GRAM STAIN: CPT | Performed by: UROLOGY

## 2025-01-07 PROCEDURE — 82365 CALCULUS SPECTROSCOPY: CPT | Performed by: UROLOGY

## 2025-01-07 PROCEDURE — 87070 CULTURE OTHR SPECIMN AEROBIC: CPT | Performed by: UROLOGY

## 2025-01-10 LAB
BACTERIA SPEC AEROBE CULT: NORMAL
CALCIUM OXALATE DIHYDRATE MFR STONE IR: 20 %
COLOR STONE: NORMAL
COM MFR STONE: 70 %
COMPN STONE: NORMAL
GRAM STN SPEC: NORMAL
HYDROXYAPATITE: 10 %
LABORATORY COMMENT REPORT: NORMAL
Lab: NORMAL
Lab: NORMAL
PHOTO: NORMAL
SIZE STONE: NORMAL MM
SPEC SOURCE SUBJ: NORMAL
STONE ANALYSIS-IMP: NORMAL
WT STONE: 40 MG

## 2025-02-03 ENCOUNTER — OFFICE VISIT (OUTPATIENT)
Dept: UROLOGY | Facility: CLINIC | Age: 70
End: 2025-02-03
Payer: MEDICARE

## 2025-02-03 VITALS
TEMPERATURE: 97 F | BODY MASS INDEX: 27.14 KG/M2 | HEART RATE: 70 BPM | HEIGHT: 64 IN | OXYGEN SATURATION: 97 % | WEIGHT: 159 LBS | SYSTOLIC BLOOD PRESSURE: 130 MMHG | DIASTOLIC BLOOD PRESSURE: 80 MMHG

## 2025-02-03 DIAGNOSIS — N13.8 BPH WITH OBSTRUCTION/LOWER URINARY TRACT SYMPTOMS: ICD-10-CM

## 2025-02-03 DIAGNOSIS — N40.1 BPH WITH OBSTRUCTION/LOWER URINARY TRACT SYMPTOMS: ICD-10-CM

## 2025-02-03 DIAGNOSIS — N21.0 BLADDER STONE: Primary | ICD-10-CM

## 2025-02-03 PROCEDURE — 99213 OFFICE O/P EST LOW 20 MIN: CPT | Performed by: UROLOGY

## 2025-02-03 PROCEDURE — 1159F MED LIST DOCD IN RCRD: CPT | Performed by: UROLOGY

## 2025-02-03 PROCEDURE — 1160F RVW MEDS BY RX/DR IN RCRD: CPT | Performed by: UROLOGY

## 2025-02-03 NOTE — PROGRESS NOTES
"CC  LUTS / BPH    HPI  Mr. Pelayo is a 69 y.o. male with history below in assessment, who presents for follow up.       Past Medical History:   Diagnosis Date    Arthritis     Bell palsy 2015    Benign prostatic hyperplasia     Colon polyp     Dry eyes     Elevated PSA     Hypertension     Kidney stone 2014    Ringing in the ears, bilateral     Urinary incontinence        Past Surgical History:   Procedure Laterality Date    BLADDER SURGERY  10/29/2024    COLONOSCOPY  2024    polyp    LASIK Left          Current Outpatient Medications:     finasteride (PROSCAR) 5 MG tablet, Take 1 tablet by mouth Daily., Disp: , Rfl:     tamsulosin (FLOMAX) 0.4 MG capsule 24 hr capsule, Take 1 capsule by mouth every night at bedtime., Disp: 90 capsule, Rfl: 4    ciprofloxacin (Cipro) 500 MG tablet, Take 1 tablet by mouth 2 (Two) Times a Day. (Patient not taking: Reported on 2/3/2025), Disp: 6 tablet, Rfl: 0    loratadine (CLARITIN) 10 MG tablet, Take 1 tablet by mouth Continuous As Needed for Allergies. (Patient not taking: Reported on 2/3/2025), Disp: , Rfl:     nitrofurantoin, macrocrystal-monohydrate, (Macrobid) 100 MG capsule, Take 1 capsule by mouth 2 (Two) Times a Day. Start taking the week before the procedure (Patient not taking: Reported on 2/3/2025), Disp: 14 capsule, Rfl: 0    phenazopyridine (PYRIDIUM) 100 MG tablet, Take 1 tablet by mouth Daily As Needed (urinary burning). (Patient not taking: Reported on 2/3/2025), Disp: 5 tablet, Rfl: 0     Physical Exam  Visit Vitals  /80   Pulse 70   Temp 97 °F (36.1 °C)   Ht 162.6 cm (64\")   Wt 72.1 kg (159 lb)   SpO2 97%   BMI 27.29 kg/m²       Labs  Brief Urine Lab Results  (Last result in the past 365 days)        Color   Clarity   Blood   Leuk Est   Nitrite   Protein   CREAT   Urine HCG        09/12/24 1100 Yellow   Clear   Large (3+)   Trace   Negative   30 mg/dL (1+)                   Lab Results   Component Value Date    GLUCOSE 119 (H) 09/12/2024    CALCIUM 10.6 (H) " "09/12/2024     09/12/2024    K 4.7 09/12/2024    CO2 24.5 09/12/2024     09/12/2024    BUN 13 09/12/2024    CREATININE 1.22 09/12/2024    BCR 10.7 09/12/2024    ANIONGAP 7.5 09/12/2024       Lab Results   Component Value Date    WBC 7.14 09/12/2024    HGB 16.1 09/12/2024    HCT 47.1 09/12/2024    MCV 85.0 09/12/2024     09/12/2024       Urine Culture          9/12/2024    11:00 10/14/2024    00:00   Urine Culture   Urine Culture No growth  Final report         Lab Results   Component Value Date    PSA 4.470 (H) 07/30/2024       No results found for: \"TESTOSTERONE FREE\", \"TESTOSTERONE\", \"TOTAL\", \"TESTOSTERONE, TOTAL\"     Radiographic Studies  MRI PROSTATE W WO CONTRAST     Date of Exam: 9/3/2024 6:50 AM EDT     Indication: elevated PSA.     Comparison: None available.     Technique: Multiparametric 3T MRI of the pelvis was obtained prior to and after the intravenous administration of 14 mL of Multihance contrast. Imaging was performed per prostate protocol.     Findings:     Prostate size: 8.2 [CC] x 5.5 [AP] x 6.2 [transverse] cm for an overall volume of 145 cc.     Peripheral Zone: Typical hyperintense T2 signal. No focal, suspicious lesions.     Transition Zone: Enlarged and heterogeneous with circumscribed nodules consistent with benign prostatic hyperplasia. Circumscribed posterior extruded BPH nodule at the right prostatic base near the seminal vesicle. No suspicious lesions/signal. (PI-RADS   2 findings).     Seminal vesicles: Unremarkable.     Lymph nodes: No pelvic lymphadenopathy.     Osseous structures: No aggressive osseous lesion.     Additional findings: 15 x 8 mm ovoid structure in the dependent urinary bladder is dark on all sequences and likely represents a stone (series 7 image 7). BPH projects into the urinary bladder. No soft tissue abnormality within the field-of-view.   Visualized portions of the gastrointestinal tract are unremarkable outside of suspected mild " diverticulosis.     IMPRESSION:  Impression:     No MR findings of clinically significant prostate cancer (PI-RADS 2 exam).     BPH with gland volume of 145 cc.     Urinary bladder stone.      I have reviewed above labs and imaging.     Assessment  69 y.o. male with 145 cc cc prostate with large median lobe and continued significant LUTS with prostatitis. He is s/p cystolitholapaxy for bladder stone. Islam is still not letting me perform HoLEPs again. Has been on MMT for years with worsening LUTS.    Plan  1. Referral to  for HoLEP

## 2025-02-05 ENCOUNTER — OFFICE VISIT (OUTPATIENT)
Dept: CARDIOLOGY | Facility: CLINIC | Age: 70
End: 2025-02-05
Payer: MEDICARE

## 2025-02-05 VITALS
WEIGHT: 163 LBS | BODY MASS INDEX: 27.83 KG/M2 | HEART RATE: 72 BPM | DIASTOLIC BLOOD PRESSURE: 74 MMHG | SYSTOLIC BLOOD PRESSURE: 134 MMHG | OXYGEN SATURATION: 99 % | HEIGHT: 64 IN

## 2025-02-05 DIAGNOSIS — R03.0 ELEVATED BLOOD PRESSURE, SITUATIONAL: ICD-10-CM

## 2025-02-05 DIAGNOSIS — E78.2 MIXED HYPERLIPIDEMIA: Primary | ICD-10-CM

## 2025-02-05 DIAGNOSIS — R06.09 DOE (DYSPNEA ON EXERTION): ICD-10-CM

## 2025-02-05 NOTE — PROGRESS NOTES
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 year old male here for follow up today. Completed CT cardiac calcium score in May 2024, CCS 0. Home BP consistently less than 130/80. He is active hunting and doing yard work in the summer. Denies exertional symptoms. Planning urologic procedure at  soon.     OBJECTIVE:  Vitals:    02/05/25 1057   BP: 134/74   BP Location: Right arm   Patient Position: Sitting   Cuff Size: Adult   Pulse: 72   SpO2: 99%   Weight: 73.9 kg (163 lb)   Height: 162.6 cm (64\")     Wt Readings from Last 3 Encounters:   02/05/25 73.9 kg (163 lb)   02/03/25 72.1 kg (159 lb)   10/14/24 70.8 kg (156 lb)      Body mass index is 27.98 kg/m².     PHYSICAL EXAMINATION:  Vitals reviewed.   Constitutional:       Appearance: Normal and healthy appearance. Well-developed and not in distress.   Neck:      Vascular: JVD normal.   Pulmonary:      Effort: Pulmonary effort is normal.      Breath sounds: Normal breath sounds. No wheezing. No rhonchi. No rales.   Chest:      Chest wall: Not tender to palpatation.   Cardiovascular:      PMI at left midclavicular line. Normal rate. Regular rhythm. Normal S1. " Normal S2.       Murmurs: There is no murmur.      No gallop.  No click. No rub.   Pulses:     Intact distal pulses.   Edema:     Peripheral edema absent.   Skin:     General: Skin is warm and dry.   Neurological:      General: No focal deficit present.      Mental Status: Alert.   Psychiatric:         Behavior: Behavior is cooperative.       Diagnostic Data:  Procedures    Advance Care Planning   ACP discussion was declined by the patient. Patient does not have an advance directive, declines further assistance.       ASSESSMENT:     Diagnosis Plan   1. Mixed hyperlipidemia        2. SAMPSON (dyspnea on exertion)        3. Elevated blood pressure, situational              PLAN:    Dyspnea on exertion, improved. Remains active.  Recommend 150 minutes weekly of moderate intensity exercise.    Mixed dyslipidemia, CCS 0.  Continue diet and lifestyle modification.    SBP 130s at MD visits however reports BP lower at home.  He understands goal is to keep under 130/80.  He will notify our office or follow-up with Dr. West should BP become consistently elevated.  Would recommend low-dose ACE/ARB.    Anticipates urologic procedure at [facility].       JUANITA Garcia

## 2025-04-25 ENCOUNTER — TRANSCRIBE ORDERS (OUTPATIENT)
Dept: LAB | Facility: HOSPITAL | Age: 70
End: 2025-04-25
Payer: MEDICARE

## 2025-04-25 ENCOUNTER — LAB (OUTPATIENT)
Dept: LAB | Facility: HOSPITAL | Age: 70
End: 2025-04-25
Payer: MEDICARE

## 2025-04-25 DIAGNOSIS — R33.9 BLADDER RETENTION: Primary | ICD-10-CM

## 2025-04-25 DIAGNOSIS — R33.9 BLADDER RETENTION: ICD-10-CM

## 2025-04-25 PROCEDURE — 87086 URINE CULTURE/COLONY COUNT: CPT

## 2025-04-26 LAB — BACTERIA SPEC AEROBE CULT: NORMAL
